# Patient Record
Sex: FEMALE | Race: BLACK OR AFRICAN AMERICAN
[De-identification: names, ages, dates, MRNs, and addresses within clinical notes are randomized per-mention and may not be internally consistent; named-entity substitution may affect disease eponyms.]

---

## 2019-08-27 ENCOUNTER — HOSPITAL ENCOUNTER (EMERGENCY)
Dept: HOSPITAL 97 - ER | Age: 54
Discharge: HOME | End: 2019-08-27
Payer: COMMERCIAL
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
## 2019-08-27 DIAGNOSIS — Z88.8: ICD-10-CM

## 2019-08-27 DIAGNOSIS — S52.571A: Primary | ICD-10-CM
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
## 2019-08-27 DIAGNOSIS — Z91.040: ICD-10-CM

## 2019-08-27 DIAGNOSIS — Z88.0: ICD-10-CM

## 2019-08-27 DIAGNOSIS — Z91.048: ICD-10-CM
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
## 2019-08-27 PROCEDURE — 72170 X-RAY EXAM OF PELVIS: CPT

## 2019-08-27 PROCEDURE — 99284 EMERGENCY DEPT VISIT MOD MDM: CPT

## 2019-08-27 PROCEDURE — 2W3CX1Z IMMOBILIZATION OF RIGHT LOWER ARM USING SPLINT: ICD-10-PCS

## 2019-08-27 NOTE — RAD REPORT
EXAM DESCRIPTION:  RAD - Pelvis - 8/27/2019 12:37 pm

 

CLINICAL HISTORY:  BLUNT TRAUMA

 

COMPARISON:  No comparisons

 

FINDINGS:  No acute fracture or dislocation seen. No AVN pattern observed. Hardware is present lower 
lumbar spine.

## 2019-08-27 NOTE — RAD REPORT
EXAM DESCRIPTION:  RAD - Forearm Right - 8/27/2019 12:31 pm

 

CLINICAL HISTORY:  PAIN

Trauma, pain

 

COMPARISON:  No comparisons

 

FINDINGS:  Linear nondisplaced intra-articular fracture is seen along the radial aspect distal radius
. Soft tissue swelling is present. No dislocation.

## 2019-08-27 NOTE — XMS REPORT
Patient Summary Document

 Created on:2019



Patient:MARCELO CLINE

Sex:Female

:1965

External Reference #:[id number]





Demographics







 Address  [address] [place] [address]

 

 Home Phone  [phone]

 

 Preferred Language  Unknown

 

 Marital Status  Unknown

 

 Oriental orthodox Affiliation  Unknown

 

 Race  Unknown

 

 Ethnic Group  Unknown









Author







 Organization  [facility]

 

 Address  [address]



   [place] [address]

 

 Phone  [phone]









Care Team Providers







 Name  Role  Phone

 

 NONE, NONME  Unavailable  Unavailable

 

 DR SLAVA VALLE  Unavailable  Unavailable

 

 JOSH EUGENE  Unavailable  Unavailable

 

 DR JAD HARTMAN  Unavailable  Unavailable









Problems

This patient has no known problems.



Allergies, Adverse Reactions, Alerts

This patient has no known allergies or adverse reactions.



Medications

This patient has no known medications.



Encounters







 Start  End  Encounter  Admission  Attending  Care  Care  Encounter



 Date/Time  Date/Time  Type  Type  Clinicians  Facility  Department  ID

 

 2019  Outpatient  C  NONE, NONME  [facility]  [id number]



 06:38:00  23:59:00            

 

 2018-10-26  2018-10-26  Emergency  E  SLAVA VALLE  [facility]  [id number]



 09:53:00  12:15:00            

 

 2018-10-01  2018-10-01  Emergency  E  JABIER  [facility]  [id number]



 13:13:00  14:20:00      JOSH      

 

 2018  Emergency  E  MINNIE  [facility]  [id number]



 16:25:00  19:15:00      JAD      







Results







 Test Description  Test Time  Test Comments  Text Results  Atomic Results  
Result Comments









 PRO TIME AND PTT  *WW*  2018-10-26 11:49:00    









   Test Item  Value  Reference Range  Comments









 PT (test code=TT)  10.8 s  9.8-13.6  

 

 INR (test code=INR)  0.9    

 

 INRH (test code=INRH)       **** SUGGESTED THERAPEUTIC RANGE FOR INR: ****    



      2.5 - 3.5 **  For Patients with Prosthetic    



   Valves or Patients with    



   recurrent Thromboembolic Events **  2.0 - 3.0 **    



   For Most Other Applications **    

 

 PTT (test code=PTT)  30.0 s  20.2-38.0  

 

 PTTH (test code=PTTH)  **** To monitor the effectiveness of heparin, we    



   offer the Anti-Xa  (Heparin Assay). It can be used    



   for either unfractionated or LMW  Heparin. Order    



   Code is ANTI-XA ****    



DRUGS OF ABUSE *WW*2018-10-26 11:35:00





 Test Item  Value  Reference Range  Comments

 

 DRUG SCRN (test code=HDOA)  ****URINE DRUG SCREEN***    



   ***This is an unconfirmed    



   screening result and should not    



   be used for non-medical    



   purposes***    

 

 CANNABINOD (test code=88C)  Negative  NEGATIVE  

 

 AMPHETAMINE (test code=84A)  Negative  NEGATIVE  

 

 BENZODIAZP (test code=86A)  POSITIVE  NEGATIVE  

 

 BARBITURAT (test code=85A)  Negative  NEGATIVE  

 

 OPIATES (test code=92B)  POSITIVE  NEGATIVE  

 

 COCAINE (test code=87A)  Negative  NEGATIVE  

 

 PHENCYCLID (test code=66A)  Negative  NEGATIVE  

 

 METHADONE (test code=64A)  Negative  NEGATIVE  

 

 DOAH (test code=DOAH)  **************URINE DRUG CREEN    



   CUT OFF VALUES****************    



       



                   Amphetamines    



   1000    ng/mL        Barbituates    



            300  ng/mL    



   Benzodiazepines  300     ng/mL    



        Cocaine               300    



   ng/mL  Opiates    



   300     ng/mL    



   Phencyclidine       25   ng/mL    



   THC                       50    



    ng/mL        Tricyclic    



       



                   Antidepressants    



    1000 ng/mL    



   ********************************    



   ********************************    



   *******    



URINALYSIS *WW*2018-10-26 11:31:00





 Test Item  Value  Reference Range  Comments

 

 COLOR (test code=COLU)  YELLOW  YELLOW  

 

 CLARITY (test code=CLA)  CLEAR  CLEAR  

 

 GLUCOSE UR (test code=UA GLUCOSE)  NEGATIVE  NEGATIVE  

 

 BILI UR (test code=BILE)  NEGATIVE  NEGATIVE  

 

 KETONES UR (test code=ACE)  NEGATIVE  NEGATIVE  

 

 SP GRAVITY (test code=SPGR)  1.025  1.005-1.030  

 

 PH UR (test code=PH)  6.0  4.5-8.0  

 

 PROTEIN UR (test code=PU)  NEGATIVE  NEGATIVE  

 

 UROBIL UR (test code=UROQ)  0.2 EU/dL  0.2-1.0  

 

 NITRITE UR (test code=NITRITE)  NEGATIVE  NEGATIVE  

 

 BLOOD UR (test code=UA BLOOD)  NEGATIVE  NEGATIVE  

 

 LEUK ES UR (test code=LEUK)  NEGATIVE  NEGATIVE  

 

 AUAM (test code=WAUAM)  NO  NO  



COMPREHENSIVE METABOLIC PAN *WW*2018-10-26 11:26:00





 Test Item  Value  Reference Range  Comments

 

 GLUCOSE (test code=06D)  105 mg/dL    

 

 SODIUM (test code=01A)  142 mmol/L  136-145  

 

 POTASSIUM (test code=01B)  4.0 mmol/L  3.6-5.1  

 

 CHLORIDE (test code=04A)  105 mmol/L    

 

 CO2 (test code=02A)  32 mmol/L  22-32  

 

 ANION GAP (test code=ANG)  9.0 mmol/L    

 

 BUN (test code=05D)  11 mg/dL  7-18  

 

 CREATININE (test code=03E)  0.6 mg/dL  0.4-1.1  

 

 BUN/CREA (test code=BCR)  18  12-20  

 

 CALCIUM (test code=09D)  8.5 mg/dL  8.3-9.5  

 

 BILI TOTAL (test code=11A)  0.2 mg/dL  0.2-1.0  

 

 PROTEIN (test code=07D)  6.6 g/dL  6.4-8.2  

 

 ALBUMIN (test code=08D)  3.2 g/dL  3.5-4.8  

 

 GLOBULIN (test code=GLB)  3.3 g/dL  1.5-3.8  

 

 ALB/GLOB (test code=AGRR)  0.9  1.0-2.6  

 

 ALK PHOS (test code=35A)  75 IU/L    

 

 AST (test code=30A)  13 IU/L  <=42  

 

 ALT (test code=31A)  17 IU/L  <=78  



CARDIAC PROFILE *WW*2018-10-26 11:17:00





 Test Item  Value  Reference Range  Comments

 

 TROPONIN I (test code=A84)  <0.015 ng/mL  0.000-0.045  



D-DIMER *WW*2018-10-26 11:12:00





 Test Item  Value  Reference Range  Comments

 

 D-DIMER (test code=DDI)  239 ng/mL D-DU  0-234  

 

 D-DIMER COMMENT (test  *Level to rule out DVT or PE:    



 code=DDCOM)  <235 ng/mL D-DU*    



CBC (INCLUDES AUTOMATED DIFFERENTIAL)*WW2018-10-26 10:58:00





 Test Item  Value  Reference Range  Comments

 

 WBC (test code=WBC)  6.2 10\S\3/uL  4.5-11.0  

 

 RBC (test code=RBC)  4.56 10\S\6/uL  4.20-5.60  

 

 HGB (test code=HBG)  12.6 g/dL  12.0-15.5  

 

 HCT (test code=HCT)  39.5 %  35.0-44.0  

 

 MCV (test code=MCV)  86.6 fL  81.0-99.0  

 

 MCH (test code=MCH)  27.6 pg  27.0-31.0  

 

 MCHC (test code=MCHC)  31.9 g/dL  32.0-36.0  

 

 RDW (test code=RDW)  14.9 %  11.5-14.5  

 

 PLT (test code=PLT)  225 10\S\3/uL  130-400  

 

 MPV (test code=MPV)  10.8 fL  9.4-12.4  

 

 NEUTROP # (test code=NE#)  2.8 10\S\3/uL  1.6-8.0  

 

 LYMPH # (test code=LY#)  2.6 10\S\3/uL  1.1-3.5  

 

 MONOCYTE # (test code=MO#)  0.7 10\S\3/uL  0.0-1.1  

 

 EOSINOPH # (test code=EO#)  0.1 10\S\3/uL  0.0-0.7  

 

 BASOPHIL # (test code=BA#)  0.0 10\S\3/uL  0.0-0.3  

 

 IG # (test code=IG#)  0.01 10\S\3/uL  0.00-0.06  

 

 NRBC # (test code=NRBC#)  0.00 10\S\3/uL  0.00-0.01  

 

 NEUTROPH % (test code=NE%)  45.0 %  35.0-73.0  

 

 LYMPH % (test code=LY%)  41.5 %  20.0-55.0  

 

 MONO % (test code=MO%)  10.7 %  2.5-10.0  

 

 EOSINOPH % (test code=EO%)  2.3 %  0.0-5.0  

 

 BASOPHIL % (test code=BA%)  0.3 %  0.0-2.0  

 

 IG % (test code=IG%)  0.2 %  0.0-0.8  

 

 NRBC% (test code=NRBC%)  0.0 %  0.0-0.2  

 

 MANDIFF (test code=WMDIFF)  NO  NO  

 

 RBC MORPH (test code=WRBCMOR)    NORMAL  



XR CHEST 1 VIEW PORTABLE **2018-10-26 10:30:24Portable AP chest, 1 
viewLocation Code: [id number] HISTORY: chest pain / sobCOMPARISON: NoneCOMMENT:
The lungs are clear and well inflated. The costophrenic angles are sharp. 
Thecardiomediastinal silhouette is unremarkable. The bones are intact.IMPRESSION
: No acute abnormalityXR ANKLE RIGHT COMPLETE 3 VIEWS **2018-10-01 14:11:
18Right ankle, 3 viewsLocation Code: [id number] HISTORY: Painful swelling of 
jointCOMMENTS: AP, lateral, and oblique views of the right ankle demonstrate 
noacute fracture or malalignment. The soft tissues are unremarkable.IMPRESSION: 
No acute radiographic abnormality.XR KNEE RIGHT 3 VIEWS **2018-10-01 14:10:
30Right knee, 3 viewsLocation Code: [id number] HISTORY: Painful swelling of 
jointCOMMENTS: AP, lateral, and oblique views of the right knee demonstrate no 
acutefracture or malalignment. There is marked joint space narrowing 
withsubchondral sclerosis and osteophyte formation. The soft tissues 
areunremarkable.IMPRESSION: Moderate to severe tricompartmental osteoarthritis 
with otherwiseno acute radiographic abnormality.U/S VENOUS DOPPLER MEJIA LOW EXT*
*2018 18:42:30EXAM: Bilateral lower extremity venous DopplerHISTORY: 
R60.0: LOCALIZED EDEMATECHNIQUE:  Grayscale real-time B-mode imaging with color 
flow and spectralflow Doppler analysis was performed of bilateral lower 
extremities.COMPARISON: NoneFINDINGS: Limited exam due to body habitus.There is 
normal compressibility with no evidence of thrombus in the visualizedvenous 
structures of bilateral lower extremities.The vessels imaged include bilateral 
common femoral, superficial femoral,popliteal, proximal profunda femoral, and 
greater saphenous veins.  The peroneal and posterior tibial veins were seen as 
well.IMPRESSION:No evidence of deep venous thrombosis within the visualized 
venous structuresof bilateral lower extremities.D-DIMER *WW*2018 17:58:00





 Test Item  Value  Reference Range  Comments

 

 D-DIMER (test code=DDI)  <200 ng/mL D-DU  0-234  

 

 D-DIMER COMMENT (test  *Level to rule out DVT or PE:    



 code=DDCOM)  <235 ng/mL D-DU*    



PRO TIME AND PTT  *WW*2018 17:43:00





 Test Item  Value  Reference Range  Comments

 

 PT (test code=TT)  10.0 s  9.8-13.6  

 

 INR (test code=INR)  0.9    

 

 INRH (test code=INRH)       **** SUGGESTED THERAPEUTIC RANGE    



   FOR INR: ****    2.5 - 3.5 **  For    



   Patients with Prosthetic Valves or    



   Patients with    



   recurrent Thromboembolic Events **    



   2.0 - 3.0 ** For Most Other    



   Applications **    

 

 PTT (test code=PTT)  30.3 s  20.2-38.0  

 

 PTTH (test code=PTTH)  **** To monitor the effectiveness of    



   heparin, we offer the Anti-Xa    



   (Heparin Assay). It can be used for    



   either unfractionated or LMW  Heparin.    



   Order Code is ANTI-XA ****    



CARDIAC PROFILE *WW*2018 17:42:00





 Test Item  Value  Reference Range  Comments

 

 TROPONIN I (test code=A84)  <0.015 ng/mL  0.000-0.045  

 

 CKMB (test code=A49)  1.0 ng/mL  <=3.6  

 

 CPK (test code=32A)  123 IU/L    



BRAIN NATRIURETIC PROTEIN *WW*2018 17:42:00





 Test Item  Value  Reference Range  Comments

 

 proBNP (test code=PBNP)  70 pg/mL  0-125  



COMPREHENSIVE METABOLIC PAN *WW*2018 17:39:00





 Test Item  Value  Reference Range  Comments

 

 GLUCOSE (test code=06D)  131 mg/dL    

 

 SODIUM (test code=01A)  143 mmol/L  136-145  

 

 POTASSIUM (test code=01B)  3.6 mmol/L  3.6-5.1  

 

 CHLORIDE (test code=04A)  106 mmol/L    

 

 CO2 (test code=02A)  32 mmol/L  22-32  

 

 ANION GAP (test code=ANG)  8.8 mmol/L    

 

 BUN (test code=05D)  9 mg/dL  7-18  

 

 CREATININE (test code=03E)  0.6 mg/dL  0.4-1.1  

 

 BUN/CREA (test code=BCR)  15  12-20  

 

 CALCIUM (test code=09D)  8.4 mg/dL  8.3-9.5  

 

 BILI TOTAL (test code=11A)  0.2 mg/dL  0.2-1.0  

 

 PROTEIN (test code=07D)  6.6 g/dL  6.4-8.2  

 

 ALBUMIN (test code=08D)  3.1 g/dL  3.5-4.8  

 

 GLOBULIN (test code=GLB)  3.5 g/dL  1.5-3.8  

 

 ALB/GLOB (test code=AGRR)  0.9  1.0-2.6  

 

 ALK PHOS (test code=35A)  70 IU/L    

 

 AST (test code=30A)  13 IU/L  <=42  

 

 ALT (test code=31A)  20 IU/L  <=78  



CBC (INCLUDES AUTOMATED DIFFERENTIAL)*[id number] 17:36:00





 Test Item  Value  Reference Range  Comments

 

 WBC (test code=WBC)  6.6 10\S\3/uL  4.5-11.0  

 

 HGB (test code=HBG)  11.8 g/dL  12.0-15.5  

 

 HCT (test code=HCT)  37.0 %  35.0-44.0  

 

 MCV (test code=MCV)  87.7 fL  81.0-99.0  

 

 MCH (test code=MCH)  28.0 pg  27.0-31.0  

 

 MCHC (test code=MCHC)  31.9 g/dL  32.0-36.0  

 

 RDW (test code=RDW)  15.7 %  11.5-14.5  

 

 PLT (test code=PLT)  197 10\S\3/uL  130-400  

 

 MPV (test code=MPV)  10.9 fL  9.4-12.4  

 

 NEUTROP # (test code=NE#)  3.2 10\S\3/uL  1.6-8.0  

 

 LYMPH # (test code=LY#)  2.5 10\S\3/uL  1.1-3.5  

 

 MONOCYTE # (test code=MO#)  0.8 10\S\3/uL  0.0-1.1  

 

 EOSINOPH # (test code=EO#)  0.2 10\S\3/uL  0.0-0.7  

 

 BASOPHIL # (test code=BA#)  0.0 10\S\3/uL  0.0-0.3  

 

 IG # (test code=IG#)  0.02 10\S\3/uL  0.00-0.06  

 

 NRBC # (test code=NRBC#)  0.00 10\S\3/uL  0.00-0.01  

 

 NEUTROPH % (test code=NE%)  47.9 %  35.0-73.0  

 

 LYMPH % (test code=LY%)  37.7 %  20.0-55.0  

 

 MONO % (test code=MO%)  11.5 %  2.5-10.0  

 

 EOSINOPH % (test code=EO%)  2.3 %  0.0-5.0  

 

 BASOPHIL % (test code=BA%)  0.3 %  0.0-2.0  

 

 IG % (test code=IG%)  0.3 %  0.0-0.8  

 

 NRBC% (test code=NRBC%)  0.0 %  0.0-0.2  

 

 MANDIFF (test code=WMDIFF)  NO  NO  

 

 RBC MORPH (test code=WRBCMOR)    NORMAL  



URINALYSIS *WW*2018 17:28:00





 Test Item  Value  Reference Range  Comments

 

 COLOR (test code=COLU)  YELLOW  YELLOW  

 

 CLARITY (test code=CLA)  CLEAR  CLEAR  

 

 GLUCOSE UR (test code=UA GLUCOSE)  NEGATIVE  NEGATIVE  

 

 BILI UR (test code=BILE)  NEGATIVE  NEGATIVE  

 

 KETONES UR (test code=ACE)  NEGATIVE  NEGATIVE  

 

 SP GRAVITY (test code=SPGR)  1.025  1.005-1.030  

 

 PH UR (test code=PH)  6.0  4.5-8.0  

 

 PROTEIN UR (test code=PU)  NEGATIVE  NEGATIVE  

 

 UROBIL UR (test code=UROQ)  0.2 EU/dL  0.2-1.0  

 

 NITRITE UR (test code=NITRITE)  NEGATIVE  NEGATIVE  

 

 BLOOD UR (test code=UA BLOOD)  NEGATIVE  NEGATIVE  

 

 LEUK ES UR (test code=LEUK)  NEGATIVE  NEGATIVE  

 

 AUAM (test code=WAUAM)  NO  NO

## 2019-08-27 NOTE — XMS REPORT
Summary of Care

 Created on:2019



Patient:MARCELO CLINE

Sex:Female

:1965

External Reference #:[id number]





Demographics







 Address  [address]



   [address] [place], [place] [address] [address]

 

 Phone  Unavailable

 

 Preferred Language  English

 

 Marital Status  Unknown

 

 Protestant Affiliation  Unknown

 

 Race  Other Race

 

 Ethnic Group  Not  or 









Author







 Name  EDITA HERNANDEZ, ARTHUR

 

 Address  Unavailable



   Unavailable



   ,









Care Team Providers







 Name  Role  Phone

 

 CAROLINA HERNANDEZ, TC  Unavailable  Unavailable

 

 ANABEL HERNANDEZ, DONELL  Unavailable  Unavailable

 

 EDITA HERNANDEZ, ARTHUR  Unavailable  Unavailable

 

 BRIAN HERNANDEZ, DAVID  Unavailable  Unavailable

 

 CAROLINA PLASCENCIA, TC  Unavailable  Unavailable

 

 ANABEL PLASCENCIA UT, DONELL  Unavailable  Unavailable

 

 EDITA DUBON MD UT, ARTHUR BONILLA  Unavailable  Unavailable

 

 Jeremy Fall MD  Unavailable  Unavailable

 

 JAMES FNP UT, CORA JURADO  Unavailable  Unavailable

 

 RAMIREZ DO, KEYANNA R  Unavailable  Unavailable

 

 BIJOU FNP, MOHSENNAE F  Unavailable  Unavailable

 

 TAN LCSW, ELI GONZALEZ  Unavailable  Unavailable

 

 BIJOU N.P., JERVEENA  Unavailable  Unavailable

 

 CASTORENA FNP, BERTO K  Unavailable  Unavailable

 

 Unavailable  Unavailable  Unavailable









Functional Status







 Name  Dates  Details

 

 Functional status health issues are not documented    Status:









 Name  Dates  Details

 

 Cognitive status health issues are not documented    Status:







Problems







 Name  Dates  Details

 

 Caloric malnutrition (263.9, E46)    Status: Active

 

 Lumbar strain (847.2, S39.012A)    Status: Active

 

 Sciatic pain, left (724.3, M54.32)    Status: Active

 

 Acid reflux (530.81, K21.9)    Status: Active

 

 Right wrist pain (719.43, M25.531)    Status: Active

 

 Right knee pain (719.46, M25.561)    Status: Active

 

 History of hepatitis C (V12.09, Z86.19)    Status: Active

 

 Need for hepatitis A vaccination (V05.3, Z23)    Status: Active

 

 Need for hepatitis B vaccination (V05.3, Z23)    Status: Active

 

 Cervical radiculopathy (723.4, M54.12)    Status: Active

 

 Influenza vaccination declined (V64.06, Z28.21)    Status: Active

 

 Influenza vaccination declined (V64.06, Z28.21)    Status: Active

 

 Sore throat (462, J02.9)    Status: Active

 

 Acute streptococcal pharyngitis (034.0, J02.0)    Status: Active

 

 Mild intermittent asthma with acute exacerbation (493.92, J45.21)    Status: 
Active

 

 Primary localized osteoarthritis of right knee (715.16, M17.11)    Status: 
Active

 

 Viral gastroenteritis (008.8, A08.4)    Status: Active

 

 Body aches (780.96, R52)    Status: Active

 

 Fatigue (780.79, R53.83)    Status: Active

 

 Anxiety (300.00, F41.9)    Status: Active

 

 Bipolar disorder (296.80, F31.9)    Status: Active

 

 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    
Status: Active

 

 Morbid obesity (278.01, E66.01)    Status: Active

 

 Primary osteoarthritis of right knee (715.16, M17.11)    Status: Active

 

 Encounter for Medicare annual wellness exam (V70.0, Z00.00)    Status: Active

 

 Screening for cholesterol level (V77.91, Z13.220)    Status: Active

 

 Screening for breast cancer (V76.10, Z12.39)    Status: Active

 

 Chronic pain of both knees (719.46, M25.561)    Status: Active

 

 Chronic back pain (724.5, M54.9)    Status: Active

 

 Failed hearing screening (794.15, R94.120)    Status: Active







Medications







 Name  Dates  Details









 risperiDONE 3 MG Oral Tablet



 TAKE 1 TABLET AT BEDTIME.









    Quantity: 30   Refills: 3







DONELL LITTLE M.D.





  Start : 2017



Active

Zolpidem Tartrate 10 MG Oral Tablet

TAKE 1 TABLET BY MOUTH AT BEDTIME







  Quantity: 30   Refills: 0







DONELL LITTLE M.D.





  Start : 2019



Active

ALPRAZolam 1 MG Oral Tablet

TAKE ONE-HALF TO 1 TABLET BY MOUTH TWICE DAILY







  Quantity: 60   Refills: 1







DONELL LITTLE M.D.





  Start : 2017



Active

FLUoxetine HCl - 40 MG Oral Capsule

TAKE 1 CAPSULE DAILY







  Quantity: 30   Refills: 3







DONELL LITTLE M.D.





  Start : 2017



Active

Nystop 923557 UNIT/GM External Powder

APPLY 2- 3 TIMES EVERY DAY TO AFFECTED AREA(S). AFTER COMPLETION OF CREAM







  Quantity: 180   Refills: 0







TC FIGUEROA M.D.





  Start : 2019



Active

Fluocinolone Acetonide 0.025 % External Cream

APPLY SPARINGLY TO AFFECTED AREA(S) TWICE DAILY







  Quantity: 1   Refills: 0







ARTHUR KOHLER M.D.





  Start : 2018



Active

60 GM Tube

Pennsaid 2 % Transdermal Solution

2 PUMPS TWICE DAILY TO THE EFFECTED AREA







  Quantity: 1   Refills: 0







DAVID TORRES M.D.





  Start : 2018



Active

112 GM Pump Btl

ProAir  (90 Base) MCG/ACT Inhalation Aerosol Solution

INHALE 1 TO 2 PUFFS EVERY 4 TO 6 HOURS AS NEEDED.







  Quantity: 1   Refills: 2







         R.N.Active

8.5 GM Inhaler

Combivent Respimat  MCG/ACT Inhalation Aerosol Solution

INHALE 2 PUFFS DAILY







  Refills: 0







         R.N.Active

4 GM Inhaler





Allergies and Adverse Reactions







 Name  Dates  Details

 

 Adhesive Tape TAPE (Allergy)    Status: Active

 

 Iodinated Contrast Media (Allergy)    Status: Active

 

 Latex Gloves MISC (Allergy)    Status: Active

 

 Naproxen TABS (Allergy)    Status: Active

 

 Penicillins (Allergy)    Status: Active

 

 Procaine HCl SOLN (Allergy)    Status: Active







Past Medical History







 Name  Dates  Details

 

 History of Acute recurrent maxillary sinusitis (461.0, J01.01)    Status: 
Resolved

 

 History of Acute suppurative otitis media of left ear with spontaneous rupture 
of tympanic membrane, recurrence not specified (382.01, H66.012)    Status: 
Resolved

 

 History of Antibiotic-induced yeast infection (112.9, B37.9)    Status: 
Resolved

 

 History of Body aches (780.96, R52)    Status: Resolved

 

 History of Chronic pruritic rash in adult (698.8, L29.8)    Status: Resolved

 

 History of cough    Status: Resolved

 

 History of Encounter to establish care (V65.8, Z76.89)    Status: Resolved

 

 History of Failed total knee, left, sequela (909.3, T84.093S)    Status: 
Resolved

 

 History of Failed total knee, left, subsequent encounter (V58.89, T84.093D)    
Status: Resolved

 

 History of Follow up (V67.9, Z09)    Status: Resolved

 

 History of hepatitis B virus infection (V12.09, Z86.19)    Status: Resolved

 

 History of intestinal malabsorption (V12.79, Z87.19)    Status: Resolved

 

 History of Left lower quadrant abdominal pain of unknown etiology (789.04, 
R10.32)    Status: Resolved

 

 History of malnutrition (V12.1, Z86.39)    Status: Resolved

 

 History of Need for Tdap vaccination (V06.1, Z23)    Status: Resolved

 

 History of persistent cough (V12.69, Z87.09)    Status: Resolved

 

 History of Preoperative clearance (V72.84, Z01.818)    Status: Resolved

 

 History of Routine lab draw (V72.60, Z01.89)    Status: Resolved

 

 History of screening mammography (V15.89, Z92.89)    Status: Resolved

 

 History of screening mammography (V15.89, Z92.89)    Status: Resolved

 

 History of sore throat (V12.60, Z87.09)    Status: Resolved

 

 History of tinea cruris (V12.09, Z86.19)    Status: Resolved

 

 History of Trapezius muscle spasm (728.85, M62.838)    Status: Resolved

 

 History of Vulvar itching (698.1, L29.2)    Status: Resolved

 

 History of Well woman exam (V72.31, Z01.419)    Status: Resolved

 

 History of Wrist pain, acute, right (719.43, M25.531)    Status: Resolved







Procedures







 Procedure  Dates  Details

 

 History of Shoulder replacement    Completed

 

 History of Back surgery    Completed

 

 History of Total hysterectomy abdominal    Completed

 

 History of Knee replacement    Completed







Immunization







 Name  Dates  Details

 

 Tdap (Boostrix)  on: 12-Sep-2017  



 Lot #: [id number]    

 

 Engerix-B 10 MCG/0.5ML Intramuscular Injectable  on: 2018  



 Lot #: [id number]    

 

 Hepatitis A, adult  on: 2018  



 Lot #: [id number]    







Family History







 Name  Dates  Details

 

 Family history of alcohol abuse (V61.41, Z81.1)    Comments: Maternal Relatives



     Status: Active

 

 Family history of alcohol abuse (V61.41, Z81.1)    Comments:  Paternal 
Relatives



     Status: Active









 Name  Dates  Details

 

 Family history of rheumatoid arthritis (V17.7, Z82.61)    Status: Active









 Name  Dates  Details

 

 Family history of neoplasm of brain (V19.8, Z84.89)    Status: Active

 

 Family history of malignant neoplasm of prostate (V16.42, Z80.42)    Status: 
Active









 Name  Dates  Details

 

 Family history of Anxiety (300.00, F41.9)    Status: Active

 

 Family history of malignant neoplasm of breast (V16.3, Z80.3)    Status: Active







Social History







 Name  Dates  Details

 

 -    Status:









 Name  Dates  Details

 

 Smoker. current status unknown    







Vital Signs







 Date  Test  Result  Details

 

       

 

   No Known Vitals to report    



       







Results







 Date  Description  Value  Details

 

   Results not documented    



       

 

       







Plan of Care







 Name  Dates  Details









 Planned Observations









 Planned Goals not documented    









 Planned Encounters









 Appointment; ARTHUR KOHLER M.D.  On: 22-Aug-2019 11:15

 

 Appointment; ARTHUR KOHLER M.D.  On: 24-Oct-2019 10:45







Instructions







 Name  Dates  Details

 

 Instructions not documented    







Encounters







 Appointment; TC FIGUEROA M.D.  On: 15-Aug-2017 11:15



 Encounter Diagnosis: Problem not documented  

 

 Appointment; BERTO MILNER, PHD  On: 16-Aug-2017 10:30



 Encounter Diagnosis: Problem not documented  

 

 Appointment; LAY PARK M.D.  On: 18-Aug-2017 10:00



 Encounter Diagnosis: Problem not documented  

 

 Appointment; KENDAL ROBLEDO P.A.  On: 8-Sep-2017 10:15



 Encounter Diagnosis: Problem not documented  

 

 Appointment; DONELL LITTLE M.D.  On: 11-Sep-2017 8:00



 Encounter Diagnosis: Problem not documented  

 

 Appointment; TC FIGUEROA M.D.  On: 12-Sep-2017 9:30



 Encounter Diagnosis: Problem not documented  

 

 Appointment; BERTO MILNER, PHD  On: 12-Sep-2017 10:30



 Encounter Diagnosis: Problem not documented  

 

 Appointment; ANGELLA MARTINEZ D.O.  On: 22-Sep-2017 9:00



 Encounter Diagnosis: Problem not documented  

 

 Appointment; ARTHUR KOHLER M.D.  On: 4-Oct-2017 10:30



 Encounter Diagnosis: Problem not documented  

 

 Appointment; LAY PARK M.D.  On: 20-Oct-2017 9:00



 Encounter Diagnosis: Problem not documented  

 

 Appointment; DAVID TORRES M.D.  On: 30-Oct-2017 9:30



 Encounter Diagnosis: Problem not documented  

 

 Appointment; DONELL LITTLE M.D.  On: 2017 9:30



 Encounter Diagnosis: Problem not documented  

 

 Appointment; ARTHUR KOHLER M.D.  On: 15-Nov-2017 10:30



 Encounter Diagnosis: Problem not documented  

 

 Appointment; CT FIGUEROA M.D.  On: 2017 11:00



 Encounter Diagnosis: Problem not documented

## 2019-08-27 NOTE — RAD REPORT
EXAM DESCRIPTION:  RAD - Wrist Right 3 View - 8/27/2019 12:31 pm

 

CLINICAL HISTORY:  PAIN

Pain

 

COMPARISON:  <Comparisons>

 

FINDINGS:  Linear lucency is seen along the radial aspect of the distal radius compatible with a nond
isplaced fracture.  No dislocation evident.

## 2019-08-27 NOTE — XMS REPORT
Clinical Summary

 Created on:2019



Patient:Olya Triana

Sex:Female

:1965

External Reference #:[id number]





Demographics







 Address  [address] [place] [address]

 

 Home Phone  [phone]

 

 Email Address  [email or web address]

 

 Preferred Language  English

 

 Marital Status  

 

 Buddhism Affiliation  Unknown

 

 Race  Black or 

 

 Ethnic Group  Not  or 









Author







 Organization  [employer]

 

 Address  [address] [place] [address]









Support







 Name  Relationship  Address  Phone

 

 Madelin Mosher  Unavailable  Unavailable  [phone]









Care Team Providers







 Name  Role  Phone

 

 Marilee Murrieta MD  Primary Care Provider  [phone]









Allergies







 Active Allergy  Reactions  Severity  Noted Date  Comments

 

 Iodine  Shortness Of Breath  High  2017  

 

 Latex  Rash  Low  2017  

 

 Lidocaine      2017  

 

 Naproxen  Hives    2017  

 

 Penicillin G      2017  







Medications







 Medication  Sig  Dispensed  Refills  Start Date  End Date  Status

 

 HYDROcodone-acetaminophe  Take 1 tablet by    0  2017    Active



 n (NORCO)  mg per  mouth 3 (three)          



 tablet  times a day.          

 

 ziprasidone (GEODON) 80  Take 1 capsule    2  2017    Active



 MG capsule  by mouth daily.          

 

 ALPRAZolam (XANAX) 2 MG  Take 2 mg by    0      Active



 tablet  mouth nightly as          



   needed for          



   anxiety.          

 

 zolpidem (AMBIEN) 10 mg  Take 1 tablet by    2  2017    Active



 tablet  mouth nightly.          

 

 cyanocobalamin 1000 MCG  Take 1,000 mcg    0      Active



 tablet  by mouth daily.          

 

 promethazine-codeine  Take 5 mL by    0      Active



 (PHENERGAN with CODEINE)  mouth every 4          



 6.25-10 mg/5 mL syrup  (four) hours as          



   needed for          



   cough.          







Active Problems

No known active problems



Family History







 Medical History  Relation  Name  Comments

 

 Cancer  Maternal Grandfather    

 

 Coronary artery disease  Maternal Grandfather    

 

 Alcohol abuse  Mother    

 

 Cancer  Sister    









 Relation  Name  Status  Comments

 

 Maternal Grandfather      

 

 Mother      

 

 Sister      







Social History







 Tobacco Use  Types  Packs/Day  Years Used  Date

 

 Current Every Day Smoker    1    









 Smokeless Tobacco: Never Used      









 Alcohol Use  Drinks/Week  oz/Week  Comments

 

 No      Occasionally









 Sex Assigned at Birth  Date Recorded

 

 Not on file  









 Job Start Date  Occupation  Industry

 

 Not on file  Not on file  Not on file









 Travel History  Travel Start  Travel End









 No recent travel history available.







Last Filed Vital Signs

Not on file



Plan of Treatment







 Health Maintenance  Due Date  Last Done  Comments

 

 CERVICAL CANCER SCREENING  1985    

 

 BREAST CANCER SCREENING  2014    

 

 COLONOSCOPY SCREENING  2014    

 

 SHINGLES VACCINES (#1)  2014    

 

 INFLUENZA VACCINE  2019    







Results

Not on fileafter 2018



Insurance







 Payer  Benefit Plan / Group  Subscriber ID  Effective Dates  Phone  Address  
Type

 

 UHC MEDICARE UHC DUAL COMPLETE [facility]  xxxxxxxxx  2017-Present      HMO









 Guarantor Name  Account Type  Relation to  Date of Birth  Phone  Billing



     Patient      Address

 

 Olya Triana  Personal/Family  Self  1965  [phone] [address]



         (Home)  [place]



           [address]

## 2019-08-27 NOTE — RAD REPORT
EXAM DESCRIPTION:  Shoulder  Right 2 View - 8/27/2019 12:31 pm

 

CLINICAL HISTORY:  Fall, right shoulder and upper extremity pain

 

COMPARISON:  None.

 

TECHNIQUE:  Internal and external rotation views of the right shoulder were obtained.

 

FINDINGS:  There is no fracture or dislocation.  AC joint is normal in appearance. No acute or suspic
ious findings.

 

IMPRESSION:  Negative two-view right shoulder examination.
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
## 2019-08-27 NOTE — EDPHYS
Physician Documentation                                                                           

 [facility]                                                                 

Name: Olya Triana                                                                                 

Age: 54 yrs                                                                                       

Sex: Female                                                                                       

: 1965                                                                                   

MRN: [id number]                                                                                   

Arrival Date: 2019                                                                          

Time: 10:28                                                                                       

Account#: [id number]                                                                            

Bed 27                                                                                            

Private MD: Unknown, Unknown                                                                      

ED Physician Juliano Ghosh                                                                         

HPI:                                                                                              

                                                                                             

11:13 This 54 yrs old Black Female presents to ER via Wheelchair with complaints of Fall      rn  

      Injury, Wrist Injury, Leg Injury.                                                           

11:13 Details of fall: The patient fell from a height, down approximately 5 stairs.           rn  

      Associated injuries: The patient sustained right wrist, left leg. Severity of symptoms:     

      At their worst the symptoms were moderate, in the emergency department the symptoms are     

      unchanged. The patient has not experienced similar symptoms in the past. Reports lost       

      footing, fell approx five steps, hit concrete floor and pole with outstretched hand,        

      reports majority of pain to right wrist. Denies head injury. No neck or new back pain.      

      Denies sob/abd pain. Reports scaped left thigh, no bony pain. Not on blood thinners. .      

                                                                                                  

OB/GYN:                                                                                           

10:34 LMP N/A - Hysterectomy                                                                  la1 

                                                                                                  

Historical:                                                                                       

- Allergies:                                                                                      

10:34 PENICILLINS;                                                                            la1 

10:34 Iodine;                                                                                 la1 

10:34 Latex, Natural Rubber;                                                                  la1 

10:34 Celebrex;                                                                               la1 

10:34 Tizanidine;                                                                             la1 

- PMHx:                                                                                           

10:34 Bipolar disorder; Depression; Schizophrenia; Myocardial infarction;                     la1 

- PSHx:                                                                                           

10:34 back, knee sx;                                                                          la1 

                                                                                                  

- Immunization history:: Adult Immunizations up to date.                                          

- Social history:: Smoking status: Patient/guardian denies using tobacco.                         

- Immunization history: Last tetanus immunization: n/a.                                           

- Ebola Screening: : No symptoms or risks identified at this time.                                

- Family history:: not pertinent.                                                                 

- Hospitalizations: : No recent hospitalization is reported.                                      

                                                                                                  

                                                                                                  

ROS:                                                                                              

11:13 Constitutional: Negative for fever, chills, and weight loss, Eyes: Negative for injury, rn  

      pain, redness, and discharge, Neck: Negative for injury, pain, and swelling,                

      Cardiovascular: Negative for chest pain, palpitations, and edema, Respiratory: Negative     

      for shortness of breath, cough, wheezing, and pleuritic chest pain, Abdomen/GI:             

      Negative for abdominal pain, nausea, vomiting, diarrhea, and constipation, Back:            

      Negative for injury and pain, MS/Extremity: + right wrist pain and swelling, + left         

      posterior thigh pain and abrasion. Neuro: Negative for headache, weakness, numbness,        

      tingling, and seizure.                                                                      

                                                                                                  

Exam:                                                                                             

11:13 Constitutional:  This is a well developed, well nourished patient who is awake, alert,  rn  

      and in no acute distress. Head/Face:  Normocephalic, atraumatic. Eyes:  Pupils equal        

      round and reactive to light, extra-ocular motions intact.  Lids and lashes normal.          

      Conjunctiva and sclera are non-icteric and not injected.  Cornea within normal limits.      

      Periorbital areas with no swelling, redness, or edema. Neck:  Trachea midline, no           

      thyromegaly or masses palpated, and no cervical lymphadenopathy.  Supple, full range of     

      motion without nuchal rigidity, or vertebral point tenderness.  No Meningismus.             

      Chest/axilla:  Normal chest wall appearance and motion.  Nontender with no deformity.       

      No lesions are appreciated. Cardiovascular:  Regular rate and rhythm.  No pulse             

      deficits. Respiratory:  Lungs have equal breath sounds bilaterally, clear to                

      auscultation.  No increased work of breathing, no retractions or nasal flaring.             

      Abdomen/GI:  soft, non-tender Back:  No spinal tenderness.  No costovertebral               

      tenderness.  Full range of motion. MS/ Extremity:  Pulses equal, no cyanosis.               

      Neurovascular intact.  + painful ROM from right shoulder to right wrist, + focal            

      swelling only about right wrist with bony tenderness. + 8cm linear abrasion to left         

      posterior thigh, no laceration, no hip or bony tenderness. Neuro:  Awake and alert, GCS     

      15, oriented to person, place, time, and situation.  Cranial nerves II-XII grossly          

      intact.  Motor strength 5/5 in all extremities.  Sensory grossly intact.  Cerebellar        

      exam normal.                                                                                

                                                                                                  

Vital Signs:                                                                                      

10:34  / 106; Pulse 89; Resp 16; Temp 97.6; Pulse Ox 98% on R/A; Weight 130.18 kg;      la1 

      Height 5 ft. 4 in. (162.56 cm); Pain 9/10;                                                  

12:00  / 92; Pulse 91; Resp 17; Pulse Ox 99% on R/A;                                    tw2 

14:05  / 88; Pulse 87; Resp 17; Pulse Ox 99% on R/A;                                    tw2 

10:34 Body Mass Index 49.26 (130.18 kg, 162.56 cm)                                            la1 

                                                                                                  

Yunior Coma Score:                                                                               

10:45 Eye Response: spontaneous(4). Verbal Response: oriented(5). Motor Response: obeys       tw2 

      commands(6). Total: 15.                                                                     

                                                                                                  

Trauma Score (Adult):                                                                             

10:35 Eye Response: spontaneous(1); Verbal Response: oriented(1); Motor Response: obeys       tw2 

      commands(2); Systolic BP: > 89 mm Hg(4); Respiratory Rate: 10 to 29 per min(4); Blanca     

      Score: 15; Trauma Score: 12                                                                 

                                                                                                  

MDM:                                                                                              

10:36 Patient medically screened.                                                             rn  

12:54 Differential diagnosis: contusion, fracture, sprain, strain. Data reviewed: vital       rn  

      signs, nurses notes, radiologic studies, plain films, and as a result, I will discharge     

      patient. Test interpretation: by ED physician or midlevel provider: plain radiologic        

      studies, Xray right wrist shows non-displaced fracture distal radius, intraarticular. .     

      Counseling: I had a detailed discussion with the patient and/or guardian regarding: the     

      historical points, exam findings, and any diagnostic results supporting the                 

      discharge/admit diagnosis, radiology results, the need for outpatient follow up, to         

      return to the emergency department if symptoms worsen or persist or if there are any        

      questions or concerns that arise at home. Special discussion: I discussed with the          

      patient/guardian in detail that at this point there is no indication for admission to       

      the hospital. It is understood, however, that if the symptoms persist or worsen the         

      patient needs to return immediately for re-evaluation. Based on the history and exam        

      findings, there is no indication for further emergent testing or inpatient evaluation.      

      I discussed with the patient/guardian the need to see the orthopedic surgeon for            

      further evaluation of the symptoms.                                                         

                                                                                                  

                                                                                             

10:44 Order name: XRAY Shoulder RIGHT 2 view; Complete Time: 12:52                            rn  

                                                                                             

10:44 Order name: XRAY Humerus RIGHT; Complete Time: 12:52                                    rn  

                                                                                             

10:44 Order name: XRAY Forearm RIGHT; Complete Time: 12:52                                    rn  

                                                                                             

10:44 Order name: XRAY Wrist RIGHT 3 view; Complete Time: 12:52                               rn  

                                                                                             

10:44 Order name: XRAY Pelvis; Complete Time: 12:52                                           rn  

                                                                                             

10:44 Order name: XRAY Femur LEFT; Complete Time: 12:52                                       rn  

                                                                                             

12:53 Order name: Splint - Sugar Tong - Forearm: right wrist/forearm; Complete Time: 14:04    rn  

                                                                                             

13:19 Order name: Sling; Complete Time: 14:04                                                 tw2 

                                                                                                  

Administered Medications:                                                                         

11:10 Drug: Norco 10 mg-325 mg 1 tabs Route: PO;                                              tw2 

11:11 Follow up: RASS 0 at 1110 upon administration                                           tw2 

12:09 Follow up: Response: No adverse reaction; Pain is unchanged, physician notified; RASS:  tw2 

      Alert and Calm (0)                                                                          

                                                                                                  

                                                                                                  

Disposition:                                                                                      

19 12:57 Discharged to Home. Impression: Acute, non-displaced intra-articular fracture      

  of right distal radius, closed.                                                                 

- Condition is Stable.                                                                            

- Discharge Instructions: Wrist Fracture Treated With Immobilization.                             

- Prescriptions for Tylenol- Codeine #3 300-30 mg Oral Tablet - take 1 tablet by ORAL             

  route every 6 hours As needed; 20 tablet.                                                       

- Medication Reconciliation Form, Thank You Letter, Antibiotic Education, Prescription            

  Opioid Use form.                                                                                

- Follow up: Robin Alberto MD; When: 5 - 6 days; Reason: Recheck today's complaints,             

  Re-evaluation by your physician.                                                                

- Problem is new.                                                                                 

- Symptoms have improved.                                                                         

                                                                                                  

                                                                                                  

                                                                                                  

Signatures:                                                                                       

Dispatcher [employer]                           EDMS                                                 

Juliano Ghosh MD MD rn Attema, Lee, RN RN   la1                                                  

Sheri Robin RN                          RN   tw2                                                  

                                                                                                  

Corrections: (The following items were deleted from the chart)                                    

14:05 12:57 2019 12:57 Discharged to Home. Impression: Acute, non-displaced             tw2 

      intra-articular fracture of right distal radius, closed. Condition is Stable. Forms are     

      Medication Reconciliation Form, Thank You Letter, Antibiotic Education, Prescription        

      Opioid Use. Follow up: Dr. Robin Alberto; When: 5 - 6 days; Reason: Recheck today's          

      complaints, Re-evaluation by your physician. Problem is new. Symptoms have improved. rn     

                                                                                                  

**************************************************************************************************

## 2019-08-27 NOTE — RAD REPORT
EXAM DESCRIPTION:  RAD - Humerus Right - 8/27/2019 12:31 pm

 

CLINICAL HISTORY:  Fall, right arm pain

 

COMPARISON:  None.

 

FINDINGS:  No fracture is identified. There is no dislocation or periosteal reaction noted. No foreig
n body or other soft tissue abnormality.  Elbow joint is partially obscured but incorporated into the
 forearm report.

 

IMPRESSION:  Negative right humerus examination.